# Patient Record
(demographics unavailable — no encounter records)

---

## 2024-10-14 NOTE — PHYSICAL EXAM

## 2024-10-17 NOTE — ASSESSMENT
[FreeTextEntry1] : 78 you referred for low ferritin. Previous was 15 and now 30. Hb13. Never had endoscopy /colonoscopy Hx of chronic atrial fibrillation. Cardilogist is Dr. Marin. NO family hx of colon cancer.  Has occ gerd. No dysphagia. Uses omeprazole and famotidine for GERD.  IMP: 1. low ferritin 2..gerd 3. colon cancer screening  PLAN: 1.. She was advised to undergo colonoscopy to which she  agreed. The procedure will be performed in [facility] with the assistance of an anesthesiologist. The procedure was explained in detail and she understood the risks of the procedure not limited  to infection, bleeding, perforation, risk of anesthesia and risk of IV site problems,emergency surgery, missed lesions  or non-diagnosis of colon cancer. She  was advised that she could not drive home alone, if the patient chooses to receive sedation. Further diagnostic and treatment recommendations will be based upon the procedure and any biopsies, if they are taken. 2. She  was advised to undergo endoscopy to which she agreed. The procedure will be performed in [facility] with the assistance of an anesthesiologist. The procedure was explained in detail and she understood the risks of the procedure not limited  to infection, bleeding, perforation, risk of anesthesia and risk of IV site problems,emergency surgery, missed lesions  or non-diagnosis of stomach or esophageal  cancer.He/She]  was advised that she could not drive home alone, if the patient chooses to receive sedation. Further diagnostic and treatment recommendations will be based upon the procedure and any biopsies, if they are taken. (after colonscopy)

## 2024-10-17 NOTE — HISTORY OF PRESENT ILLNESS
[FreeTextEntry1] : 78 you referred for low ferritin. Previous was 15 and now 30. Hb13. Never had endoscopy /colonoscopy Hx of chronic atrial fibrillation. Cardilogist is Dr. Marin. NO family hx of colon cancer.  Has occ gerd. No dysphagia. Uses omeprazole and famotidine for GERD.

## 2024-10-17 NOTE — ASSESSMENT
[FreeTextEntry1] : 78 you referred for low ferritin. Previous was 15 and now 30. Hb13. Never had endoscopy /colonoscopy Hx of chronic atrial fibrillation. Cardilogist is Dr. Marin. NO family hx of colon cancer.  Has occ gerd. No dysphagia. Uses omeprazole and famotidine for GERD.  IMP: 1. low ferritin 2..gerd 3. colon cancer screening  PLAN: 1.. She was advised to undergo colonoscopy to which she  agreed. The procedure will be performed in [facility] with the assistance of an anesthesiologist. The procedure was explained in detail and she understood the risks of the procedure not limited  to infection, bleeding, perforation, risk of anesthesia and risk of IV site problems,emergency surgery, missed lesions  or non-diagnosis of colon cancer. She  was advised that she could not drive home alone, if the patient chooses to receive sedation. Further diagnostic and treatment recommendations will be based upon the procedure and any biopsies, if they are taken. 2. She  was advised to undergo endoscopy to which she agreed. The procedure will be performed in [facility] with the assistance of an anesthesiologist. The procedure was explained in detail and she understood the risks of the procedure not limited  to infection, bleeding, perforation, risk of anesthesia and risk of IV site problems,emergency surgery, missed lesions  or non-diagnosis of stomach or esophageal  cancer.He/She]  was advised that she could not drive home alone, if the patient chooses to receive sedation. Further diagnostic and treatment recommendations will be based upon the procedure and any biopsies, if they are taken. (after colonscopy)

## 2025-01-07 NOTE — REASON FOR VISIT
[Home] : at home, [unfilled] , at the time of the visit. [Medical Office: ([facility])___] : at the medical office located in  [Patient] : the patient [Self] : self [Consultation] : a consultation visit [Family Member] : family member [FreeTextEntry1] : Referred by Dr Bowling for removal of two transverse colon polyps.

## 2025-01-07 NOTE — ASSESSMENT
[FreeTextEntry1] : 78-year-old female status post colonoscopy noting to have two polyps measuring 20mm and 22mm in the transverse colon with biopsy reading tubular adenoma and recommended to have colonoscopy with Endo mucosal resection for removal of these polyps.   The risks, benefits, and alternatives of colonoscopy emr were discussed in detail with the patient.  Discussed that although this procedure has been determined to be the recommended next step in the patient's plan of care, no procedure is without risks, including bleeding, perforation, infection, missed lesions, possible need for further procedures, including surgery, and risk of anesthesia.   Discussed the importance of CLIQ prior to procedure. Instructed patient to take prescribed prep according to instructions. The patient understands nothing by mouth the morning of the procedure and that an escort home is required due to the use of anesthesia. The patient verbalized understanding and agreed to proceed.   Patient and daughter aware she will need Cardiac clearance and permission to hold Eliquis prior to procedure. Dr Marin   Plan 1. Colon EMR with Dr Chance on 2/20/2025 2. Instructions emailed. 3. Cardiac clearance required.   All questions answered.  Patient to call me with any questions.

## 2025-01-07 NOTE — HISTORY OF PRESENT ILLNESS
[FreeTextEntry1] : 12/18/2024 -The retroflexed view of the distal rectum and anal verge and showed no anal abnormalities -A 5 mm polyp was found in the sigmoid colon.  The polyp was semipedunculated.  The polyp was removed with a hot snare.  Resection and retrieval were completed. -A 7 mm polyp was found in the ileocecal valve.  The polyp was sessile.  The polyp was removed with a hot snare.  Resection and retrieval were completed. -2 semisessile polyps were found in the cecum.  The polyps were 4 to 7 mm in size.  These polyps were removed with a hot snare.  To prevent bleeding after the polypectomy, 2 hemostatic clips were successfully placed.  Clip  [employer]. -A 5 mm polyp was found in the transverse colon.  The polyp was sessile.  The polyp was removed with a hot snare.  Resection and retrieval were complete. -A 22 mm polyp was found in the transverse colon.  The polyp was Adriana classification IS (protruding, sessile).  Biopsies were taken with a cold forcep for histology.  Area was tattooed with injection of 2 mL of Indy ink opposite polyp. -A 20 mm polyp was found in the transverse colon.  The polyp was Adriana classification Is (protruding, sessile).  Area was tattooed with an injection of 1 mL of Indy ink

## 2025-01-07 NOTE — REASON FOR VISIT
[Home] : at home, [unfilled] , at the time of the visit. [Medical Office: ([facility])___] : at the medical office located in  [Patient] : the patient [Self] : self [Consultation] : a consultation visit [Family Member] : family member [FreeTextEntry1] : Referred by Dr Bowling for removal of two transverse colon polyps.

## 2025-01-07 NOTE — HISTORY OF PRESENT ILLNESS
[FreeTextEntry1] : 12/18/2024 -The retroflexed view of the distal rectum and anal verge and showed no anal abnormalities -A 5 mm polyp was found in the sigmoid colon.  The polyp was semipedunculated.  The polyp was removed with a hot snare.  Resection and retrieval were completed. -A 7 mm polyp was found in the ileocecal valve.  The polyp was sessile.  The polyp was removed with a hot snare.  Resection and retrieval were completed. -2 semisessile polyps were found in the cecum.  The polyps were 4 to 7 mm in size.  These polyps were removed with a hot snare.  To prevent bleeding after the polypectomy, 2 hemostatic clips were successfully placed.  Clip  [employer]. -A 5 mm polyp was found in the transverse colon.  The polyp was sessile.  The polyp was removed with a hot snare.  Resection and retrieval were complete. -A 22 mm polyp was found in the transverse colon.  The polyp was Adriana classification IS (protruding, sessile).  Biopsies were taken with a cold forcep for histology.  Area was tattooed with injection of 2 mL of Indy ink opposite polyp. -A 20 mm polyp was found in the transverse colon.  The polyp was Adriana classification Is (protruding, sessile).  Area was tattooed with an injection of 1 mL of Indy ink

## 2025-05-14 NOTE — ASSESSMENT
[FreeTextEntry1] : 80 y/o female s/p Colon EMR of one 22mm polyp in mid transverse colon and removal of additional colon polyps who is now recommended to have surveillance colonoscopy of site to assure no residual tissue or recurrence of site.    The recommendation was discussed in detail with patient. The risks, benefits, and alternatives of colonoscopy emr were discussed in detail with the patient.  Discussed that although this procedure has been determined to be the recommended next step in the patient's plan of care, no procedure is without risks, including bleeding, perforation, infection, missed lesions, possible need for further procedures, including surgery, and risk of anesthesia.  Discussed the prep instructions with patient and daughter and that an escort home is required due to the use of anesthesia. The patient verbalized understanding and agrees to proceed.  Patient and daughter aware she will need Cardiac clearance and permission to hold Eliquis prior to procedure. Dr Marin .   Plan 1. Colon site surveillance with Dr Chance 8/11/2025 2. Instructions emailed. 3. Cardiac clearance obtained.   I spent 15 minutes reviewing this patient's records and 20 minutes during this visit. All questions answered.  Patient to call me with any questions.

## 2025-05-14 NOTE — REASON FOR VISIT
[Home] : at home, [unfilled] , at the time of the visit. [Medical Office: ([facility])___] : at the medical office located in  [Telehealth (audio & video)] : This visit was provided via telehealth using real-time 2-way audio visual technology. [Verbal consent obtained from patient] : the patient, [unfilled] [Follow-up] : a follow-up of an existing diagnosis [This encounter was initiated by telehealth (audio with video) and converted to telephone (audio only)] : This encounter was initiated by telehealth (audio with video) and converted to telephone (audio only) [Other:___] : JOVANY

## 2025-05-14 NOTE — HISTORY OF PRESENT ILLNESS
[FreeTextEntry1] : 12/18/2024 -The retroflexed view of the distal rectum and anal verge and showed no anal abnormalities -A 5 mm polyp was found in the sigmoid colon. The polyp was semipedunculated. The polyp was removed with a hot snare. Resection and retrieval were completed. -A 7 mm polyp was found in the ileocecal valve. The polyp was sessile. The polyp was removed with a hot snare. Resection and retrieval were completed. -2 semisessile polyps were found in the cecum. The polyps were 4 to 7 mm in size. These polyps were removed with a hot snare. To prevent bleeding after the polypectomy, 2 hemostatic clips were successfully placed. Clip  [employer]. -A 5 mm polyp was found in the transverse colon. The polyp was sessile. The polyp was removed with a hot snare. Resection and retrieval were complete. -A 22 mm polyp was found in the transverse colon. The polyp was Adriana classification IS (protruding, sessile). Biopsies were taken with a cold forcep for histology. Area was tattooed with injection of 2 mL of Indy ink opposite polyp. -A 20 mm polyp was found in the transverse colon. The polyp was Adriana classification Is (protruding, sessile). Area was tattooed with an injection of 1 mL of Indy ink.   2/11/2025 - Dr Chance  - Preparation of the colon was fair due to stool in the recto-sigmoid colon, in the sigmoid colon and in the descending colon. - Internal hemorrhoids. - Diverticulosis at the hepatic flexure and in the ascending colon. - One 5 mm polyp in the cecum, removed with a cold snare. Resected and retrieved. - One 4 mm polyp in the cecum at site of prior polyp resection was seen, removed with a cold snare. Resected and retrieved. - One 20 mm polyp in the transverse colon, removed with a cold snare. Resected and retrieved. Clips (MR conditional) were placed. - One 22 mm polyp in the mid transverse colon, removed using EMR. Resected and retrieved. Clips (MR conditional) were placed. - One 5 mm polyp in the distal transverse colon, removed with a cold snare. Resected and retrieved. - The examination was otherwise normal on direct and retroflexion views.

## 2025-05-14 NOTE — REASON FOR VISIT
[Home] : at home, [unfilled] , at the time of the visit. [Medical Office: ([facility])___] : at the medical office located in  [Telehealth (audio & video)] : This visit was provided via telehealth using real-time 2-way audio visual technology. [Verbal consent obtained from patient] : the patient, [unfilled] [Follow-up] : a follow-up of an existing diagnosis [This encounter was initiated by telehealth (audio with video) and converted to telephone (audio only)] : This encounter was initiated by telehealth (audio with video) and converted to telephone (audio only) [Other:___] : JOVANY

## 2025-05-14 NOTE — HISTORY OF PRESENT ILLNESS
[FreeTextEntry1] : 12/18/2024 -The retroflexed view of the distal rectum and anal verge and showed no anal abnormalities -A 5 mm polyp was found in the sigmoid colon. The polyp was semipedunculated. The polyp was removed with a hot snare. Resection and retrieval were completed. -A 7 mm polyp was found in the ileocecal valve. The polyp was sessile. The polyp was removed with a hot snare. Resection and retrieval were completed. -2 semisessile polyps were found in the cecum. The polyps were 4 to 7 mm in size. These polyps were removed with a hot snare. To prevent bleeding after the polypectomy, 2 hemostatic clips were successfully placed. Clip  [employer]. -A 5 mm polyp was found in the transverse colon. The polyp was sessile. The polyp was removed with a hot snare. Resection and retrieval were complete. -A 22 mm polyp was found in the transverse colon. The polyp was Adriana classification IS (protruding, sessile). Biopsies were taken with a cold forcep for histology. Area was tattooed with injection of 2 mL of Indy ink opposite polyp. -A 20 mm polyp was found in the transverse colon. The polyp was Adriana classification Is (protruding, sessile). Area was tattooed with an injection of 1 mL of Indy ink.   2/11/2025 - Dr Chance  - Preparation of the colon was fair due to stool in the recto-sigmoid colon, in the sigmoid colon and in the descending colon. - Internal hemorrhoids. - Diverticulosis at the hepatic flexure and in the ascending colon. - One 5 mm polyp in the cecum, removed with a cold snare. Resected and retrieved. - One 4 mm polyp in the cecum at site of prior polyp resection was seen, removed with a cold snare. Resected and retrieved. - One 20 mm polyp in the transverse colon, removed with a cold snare. Resected and retrieved. Clips (MR conditional) were placed. - One 22 mm polyp in the mid transverse colon, removed using EMR. Resected and retrieved. Clips (MR conditional) were placed. - One 5 mm polyp in the distal transverse colon, removed with a cold snare. Resected and retrieved. - The examination was otherwise normal on direct and retroflexion views.